# Patient Record
Sex: MALE | Race: WHITE | NOT HISPANIC OR LATINO | Employment: FULL TIME | URBAN - METROPOLITAN AREA
[De-identification: names, ages, dates, MRNs, and addresses within clinical notes are randomized per-mention and may not be internally consistent; named-entity substitution may affect disease eponyms.]

---
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
## 2024-05-10 ENCOUNTER — OFFICE VISIT (OUTPATIENT)
Dept: FAMILY MEDICINE | Facility: CLINIC | Age: 19
End: 2024-05-10
Payer: COMMERCIAL

## 2024-05-10 ENCOUNTER — ANCILLARY PROCEDURE (OUTPATIENT)
Dept: GENERAL RADIOLOGY | Facility: CLINIC | Age: 19
End: 2024-05-10
Attending: INTERNAL MEDICINE
Payer: COMMERCIAL

## 2024-05-10 VITALS
SYSTOLIC BLOOD PRESSURE: 142 MMHG | RESPIRATION RATE: 14 BRPM | DIASTOLIC BLOOD PRESSURE: 82 MMHG | WEIGHT: 195 LBS | HEART RATE: 67 BPM | OXYGEN SATURATION: 98 %

## 2024-05-10 DIAGNOSIS — S61.216A LACERATION OF RIGHT LITTLE FINGER, FOREIGN BODY PRESENCE UNSPECIFIED, NAIL DAMAGE STATUS UNSPECIFIED, INITIAL ENCOUNTER: ICD-10-CM

## 2024-05-10 DIAGNOSIS — S61.216A LACERATION OF RIGHT LITTLE FINGER, FOREIGN BODY PRESENCE UNSPECIFIED, NAIL DAMAGE STATUS UNSPECIFIED, INITIAL ENCOUNTER: Primary | ICD-10-CM

## 2024-05-10 PROCEDURE — 12002 RPR S/N/AX/GEN/TRNK2.6-7.5CM: CPT | Performed by: INTERNAL MEDICINE

## 2024-05-10 PROCEDURE — 73140 X-RAY EXAM OF FINGER(S): CPT | Mod: TC | Performed by: RADIOLOGY

## 2024-05-10 NOTE — PROGRESS NOTES
Assessment & Plan     Laceration of right little finger, foreign body presence unspecified, without nail trauma, initial encounter  X-ray obtained given description of trauma -no evidence of fracture from my read, radiologist review pending  -Cleaned with Hibiclens soaking.  Application of Dermabond and covered with a series of Steri-Strips maintaining reasonably good approximation of the skin edges.  Wrapped in Kerlix and taped.  Finger splint provided for general protection  Advised to avoid local-hand cleansing or soaking for the next 48 hours -then okay to remove his protective covering and to avoid use or gripping with the little finger  Advised to leave Steri-Strips alone allowed to fall off spontaneously  Counseled on signs and symptoms of further infection    - XR Finger Right G/E 2 Views; Future        Subjective   Abundio is a 18 year old, presenting for the following health issues: Presents to clinic after shearing trauma to his right little finger.  States he was grasping a crowbar pulling down on an object when the crowbar pulled through slamming his hand down onto a piece of wood underneath.  He wrapped the area on his own earlier this morning.  It seems that he had wrapped as tightly as he could, subsequently with ongoing numbness and pain regionally through the whole finger progressively worsening through the day.  No chief complaint on file.        5/10/2024     4:59 PM   Additional Questions   Roomed by Gina ALEXANDER     HPI               Objective    BP (!) 142/82   Pulse 67   Resp 14   Wt 88.5 kg (195 lb)   SpO2 98%   There is no height or weight on file to calculate BMI.  Physical Exam   General sullied appearance to both hands  Right little finger with 3cm laceration obliquely along the tuft of the finger with expected bruising and bleeding.  Initially with whitish whitish coloration throughout the finger pinking up after removal of his home wrapping.  Pain with little finger flexion  extension            Signed Electronically by: Herman Carbone MD